# Patient Record
Sex: FEMALE | Race: WHITE | HISPANIC OR LATINO | URBAN - METROPOLITAN AREA
[De-identification: names, ages, dates, MRNs, and addresses within clinical notes are randomized per-mention and may not be internally consistent; named-entity substitution may affect disease eponyms.]

---

## 2024-02-20 ENCOUNTER — OFFICE VISIT (OUTPATIENT)
Dept: FAMILY MEDICINE CLINIC | Facility: CLINIC | Age: 24
End: 2024-02-20
Payer: MEDICAID

## 2024-02-20 VITALS
WEIGHT: 142.2 LBS | HEART RATE: 73 BPM | DIASTOLIC BLOOD PRESSURE: 69 MMHG | SYSTOLIC BLOOD PRESSURE: 107 MMHG | HEIGHT: 63 IN | BODY MASS INDEX: 25.2 KG/M2 | OXYGEN SATURATION: 98 % | TEMPERATURE: 97.7 F | RESPIRATION RATE: 16 BRPM

## 2024-02-20 DIAGNOSIS — Z30.432 ENCOUNTER FOR IUD REMOVAL: ICD-10-CM

## 2024-02-20 DIAGNOSIS — Z00.00 ENCOUNTER FOR WELL ADULT EXAM WITHOUT ABNORMAL FINDINGS: Primary | ICD-10-CM

## 2024-02-20 DIAGNOSIS — N94.9 VAGINAL DISCOMFORT: ICD-10-CM

## 2024-02-20 NOTE — PROGRESS NOTES
Subjective   Solannis Reyes is a 24 y.o. female.     History of Present Illness  24-year-old female patient present with [facility] care.  She has recently moved from [facility] and wanted to be as establish patient.  She is a s/p 5-month postpartum.  She has normal vaginal delivery.  She is breast-feeding.  Patient is stated that at 6-weeks postpartum checkup she has a IUD inserted and has noticed vaginal discomfort and intermittent spotting.  She denies abnormal vaginal discharge, pelvic pain, abdominal pain, dysuria, urinary frequency or hematuria.  Patient is requesting referral for IUD removal because of above symptoms.       The following portions of the patient's history were reviewed and updated as appropriate: past medical history, past social history, past surgical history and problem list.    Review of Systems   Constitutional:  Negative for activity change, appetite change and fever.   Gastrointestinal:  Negative for abdominal pain and indigestion.   Genitourinary:  Negative for dysuria, hematuria, pelvic pain, pelvic pressure, vaginal bleeding and vaginal discharge.        Vaginal discomfort   Psychiatric/Behavioral:  Negative for depressed mood. The patient is not nervous/anxious.        Objective   Physical Exam  Vitals reviewed.   Constitutional:       Appearance: She is well-developed.   Neck:      Thyroid: No thyromegaly.   Cardiovascular:      Heart sounds: Normal heart sounds.   Pulmonary:      Effort: Pulmonary effort is normal.      Breath sounds: Normal breath sounds. No wheezing.   Abdominal:      Palpations: Abdomen is soft.      Tenderness: There is no abdominal tenderness.   Musculoskeletal:      Cervical back: Normal range of motion.   Neurological:      Mental Status: She is alert and oriented to person, place, and time.   Psychiatric:         Mood and Affect: Mood normal.         Vitals:    02/20/24 1145   BP: 107/69   Pulse: 73   Resp: 16   Temp: 97.7 °F (36.5 °C)   SpO2: 98%     No  current outpatient medications on file prior to visit.     No current facility-administered medications on file prior to visit.         Assessment & Plan   Problems Addressed this Visit       Encounter for well adult exam without abnormal findings - Primary     Discussed healthy diet and  importance of regular exercise. Stressed importance of moderation in sodium/caffeine intake,  cholesterol, caloric balance, sufficient intake of fresh fruits and vegetables.            Encounter for IUD removal    Relevant Orders    Ambulatory Referral to Obstetrics / Gynecology    Vaginal discomfort    Relevant Orders    Ambulatory Referral to Obstetrics / Gynecology     Diagnoses         Codes Comments    Encounter for well adult exam without abnormal findings    -  Primary ICD-10-CM: Z00.00  ICD-9-CM: V70.0     Encounter for IUD removal     ICD-10-CM: Z30.432  ICD-9-CM: V25.12     Vaginal discomfort     ICD-10-CM: N94.9  ICD-9-CM: 625.9